# Patient Record
Sex: MALE | Race: OTHER | HISPANIC OR LATINO | URBAN - METROPOLITAN AREA
[De-identification: names, ages, dates, MRNs, and addresses within clinical notes are randomized per-mention and may not be internally consistent; named-entity substitution may affect disease eponyms.]

---

## 2023-07-17 ENCOUNTER — EMERGENCY (EMERGENCY)
Facility: HOSPITAL | Age: 23
LOS: 1 days | Discharge: HOME | End: 2023-07-17
Attending: EMERGENCY MEDICINE
Payer: MEDICAID

## 2023-07-17 VITALS
HEART RATE: 98 BPM | DIASTOLIC BLOOD PRESSURE: 67 MMHG | SYSTOLIC BLOOD PRESSURE: 119 MMHG | RESPIRATION RATE: 18 BRPM | TEMPERATURE: 100 F | OXYGEN SATURATION: 96 %

## 2023-07-17 VITALS
DIASTOLIC BLOOD PRESSURE: 76 MMHG | WEIGHT: 218.92 LBS | TEMPERATURE: 103 F | HEART RATE: 89 BPM | OXYGEN SATURATION: 97 % | RESPIRATION RATE: 20 BRPM | SYSTOLIC BLOOD PRESSURE: 131 MMHG

## 2023-07-17 LAB — S PYO DNA THROAT QL NAA+PROBE: SIGNIFICANT CHANGE UP

## 2023-07-17 PROCEDURE — 99283 EMERGENCY DEPT VISIT LOW MDM: CPT

## 2023-07-17 PROCEDURE — 87651 STREP A DNA AMP PROBE: CPT

## 2023-07-17 PROCEDURE — 99284 EMERGENCY DEPT VISIT MOD MDM: CPT

## 2023-07-17 PROCEDURE — 0225U NFCT DS DNA&RNA 21 SARSCOV2: CPT

## 2023-07-17 PROCEDURE — T1013: CPT

## 2023-07-17 PROCEDURE — 87798 DETECT AGENT NOS DNA AMP: CPT

## 2023-07-17 RX ORDER — ACETAMINOPHEN 500 MG
975 TABLET ORAL ONCE
Refills: 0 | Status: COMPLETED | OUTPATIENT
Start: 2023-07-17 | End: 2023-07-17

## 2023-07-17 RX ORDER — IBUPROFEN 200 MG
600 TABLET ORAL ONCE
Refills: 0 | Status: COMPLETED | OUTPATIENT
Start: 2023-07-17 | End: 2023-07-17

## 2023-07-17 RX ADMIN — Medication 975 MILLIGRAM(S): at 22:04

## 2023-07-17 RX ADMIN — Medication 600 MILLIGRAM(S): at 22:04

## 2023-07-17 NOTE — ED ADULT NURSE NOTE - OBJECTIVE STATEMENT
pt presents to ed with flu like symptoms x3 days.  denies any sick contacts.  reports body aches.  NAD at this time.

## 2023-07-17 NOTE — ED PROVIDER NOTE - PHYSICAL EXAMINATION
+ erythematous oropharynx no PTA tolerating oral secretions   Physical Examination:   Gen: NAD, AOx3  Head: NCAT  HEENT: EOMI, oral mucosa moist, normal conjunctiva, neck supple  Lung: no respiratory distress  CV:  Normal perfusion  Abd: soft, NT ND  MSK: No edema, no visible deformities  Neuro: No focal neurologic deficits  Skin: No rash   Psych: normal affect

## 2023-07-17 NOTE — ED ADULT NURSE NOTE - CAS ELECT INFOMATION PROVIDED
Quality 130: Documentation Of Current Medications In The Medical Record: Current Medications Documented
Detail Level: Generalized
+understanding of instruction/DC instructions

## 2023-07-17 NOTE — ED PROVIDER NOTE - NS ED ATTENDING STATEMENT MOD
This was a shared visit with the TEO. I reviewed and verified the documentation and independently performed the documented:

## 2023-07-17 NOTE — ED PROVIDER NOTE - ATTENDING APP SHARED VISIT CONTRIBUTION OF CARE
I, Humza Goodman, performed a face to face bedside interview with this patient regarding history of present illness, review of symptoms and relevant past medical, social and family history.  I completed an independent physical examination. I have communicated the patient’s plan of care and disposition with the ACP.  23 year old presents with 2 days of body aches, congestion  Gen: NAD, well appearing  CV: RRR  Pul: CTA b/l  Abd: Soft, non-distended, non-tender  Neuro: no focal deficits  Pt is well appearing, lungs clear on exam, no increased resp effort, no hypoxia. Sx consistent with likely viral URI. Advised self quarantine, and gave strict return precautions

## 2023-07-17 NOTE — ED PROVIDER NOTE - PATIENT PORTAL LINK FT
You can access the FollowMyHealth Patient Portal offered by [facility] by registering at the following website: [email or web address]. By joining [employer]’s FollowMyHealth portal, you will also be able to view your health information using other applications (apps) compatible with our system.

## 2023-07-17 NOTE — ED PROVIDER NOTE - OBJECTIVE STATEMENT
23 year old male with no med hx presented to ED c/o body aches, sore throat, headache x 2 days. + cough congestion.  took tylenol today 12 pm. denies dysuria, chest pain, sob, rash, abd pain. no recent travel.

## 2023-07-18 LAB
RAPID RVP RESULT: SIGNIFICANT CHANGE UP
SARS-COV-2 RNA SPEC QL NAA+PROBE: SIGNIFICANT CHANGE UP

## 2024-04-22 NOTE — ED ADULT NURSE NOTE - SUICIDE SCREENING QUESTION 1
0745: Pt wanted to leave AMA. Team notified. Pt did not want to wait to speak with a provider. Writer gave pt AMA paperwork which pt signed. IV removed. Pt stated his mother picked up his medications and he has a ride waiting for him.   No